# Patient Record
Sex: MALE | Race: WHITE | NOT HISPANIC OR LATINO
[De-identification: names, ages, dates, MRNs, and addresses within clinical notes are randomized per-mention and may not be internally consistent; named-entity substitution may affect disease eponyms.]

---

## 2022-05-10 ENCOUNTER — APPOINTMENT (OUTPATIENT)
Dept: ORTHOPEDIC SURGERY | Facility: AMBULATORY SURGERY CENTER | Age: 42
End: 2022-05-10
Payer: COMMERCIAL

## 2022-05-10 VITALS — BODY MASS INDEX: 28.44 KG/M2 | WEIGHT: 210 LBS | HEIGHT: 72 IN

## 2022-05-10 DIAGNOSIS — S89.90XA UNSPECIFIED INJURY OF UNSPECIFIED LOWER LEG, INITIAL ENCOUNTER: ICD-10-CM

## 2022-05-10 DIAGNOSIS — Z80.7 FAMILY HISTORY OF OTHER MALIGNANT NEOPLASMS OF LYMPHOID, HEMATOPOIETIC AND RELATED TISSUES: ICD-10-CM

## 2022-05-10 DIAGNOSIS — Z78.9 OTHER SPECIFIED HEALTH STATUS: ICD-10-CM

## 2022-05-10 DIAGNOSIS — S53.442A ULNAR COLLATERAL LIGAMENT SPRAIN OF LEFT ELBOW, INITIAL ENCOUNTER: ICD-10-CM

## 2022-05-10 PROCEDURE — 99203 OFFICE O/P NEW LOW 30 MIN: CPT

## 2022-05-10 PROCEDURE — 73564 X-RAY EXAM KNEE 4 OR MORE: CPT | Mod: LT

## 2022-05-10 NOTE — HISTORY OF PRESENT ILLNESS
[de-identified] : Mr. Dillon is a 42 year old male who comes in for evaluation for his LEFT knee. Pain started about 2.5 weeks ago after a day of a lot of walking and a night of dancing. He doesn’t recall doing anything specific that night but had pain and swelling the nest day. He has  a history of PCL tear from 20+ years ago as well as an MCL sprain from 2 years ago. Both injuries had healed and never had any lingering issues until  this recent episode. He has been icing, elevating and taking Advil 4x/day and that has helped. He has pain with walking but stairs are ok. He has had swelling and stiffness. He is wearing a knee brace. \par Pain is 6-7/10\par

## 2022-05-10 NOTE — PHYSICAL EXAM
[LE] : Sensory: Intact in bilateral lower extremities [Normal RLE] : Right Lower Extremity: No scars, rashes, lesions, ulcers, skin intact [Normal LLE] : Left Lower Extremity: No scars, rashes, lesions, ulcers, skin intact [Normal Touch] : sensation intact for touch [Normal] : Oriented to person, place, and time, insight and judgement were intact and the affect was normal [Slightly Antalgic] : slightly antalgic [de-identified] : LEFT knee with right for comparison\par Mildly antalgic gait walking on a flexed left knee.\par 1+ left knee effusion.  No erythema or ecchymoses.\par Tender medial patella facet and mildly medial joint line.\par 2-3+ posterior drawer.\par Ia Lachman.\par 1+ varus laxity.\par Knee range of motion is from 0 to 125 degrees flexion\par Mild varus alignment knee.\par Intact extensor mechanism.\par Normal neurovascular exam distally. [de-identified] : \par \par X-rays taken today of LEFT knee weightbearing 4 views show narrowing medial joint space bone-on-bone in the far medial side on the flexed view with osteophytes consistent with KL 4 changes.  Small osteophytes patellofemoral joint.  Patella nina.

## 2022-05-10 NOTE — ASSESSMENT
[FreeTextEntry1] : 41 y/o with LEFT knee moderately severe medial compartment osteoarthritis and mild patellofemoral arthritis status post PCL tear and MCL tear in the past.  Likely his activity and dancing aggravated the knee arthritis causing the swelling.  I do not think there is any mechanical block to motion.  I recommended first trying course of an anti-inflammatory and doing physical therapy and home exercises which were given.  Ice intermittently for the swelling which has been decreasing.\par If its not better in the next month or so he should come back in for further evaluation and treatment.  With the arthritis we may consider steroid injection sparingly and we spoke about hyaluronic acid injections as an option.  With the arthritis he should try to keep his weight down and keep the muscle strong and do low impact exercise.\par Ultimately in the future knee replacement may be needed but he is young and hopefully can manage this for years.  He really was not having symptoms of the arthritis chronically until the recent event.\par He should follow-up in a month if he is not better or as needed.

## 2022-06-07 ENCOUNTER — APPOINTMENT (OUTPATIENT)
Dept: ORTHOPEDIC SURGERY | Facility: AMBULATORY SURGERY CENTER | Age: 42
End: 2022-06-07
Payer: COMMERCIAL

## 2022-06-07 DIAGNOSIS — Z00.00 ENCOUNTER FOR GENERAL ADULT MEDICAL EXAMINATION W/OUT ABNORMAL FINDINGS: ICD-10-CM

## 2022-06-07 PROCEDURE — 99214 OFFICE O/P EST MOD 30 MIN: CPT | Mod: 25

## 2022-06-07 PROCEDURE — 20610 DRAIN/INJ JOINT/BURSA W/O US: CPT | Mod: 59,LT

## 2022-06-07 NOTE — HISTORY OF PRESENT ILLNESS
[de-identified] : Mr. Dillon is a 42 year old male who comes in for follow-up for his left knee pain and swelling with history of prior PCL tear about 20 years ago and MCL tear about 5 years ago.  He has ongoing pain and swelling in his knee.  I spoke to him on the phone last week.  He had a lot of night pain.  I had him try gabapentin which does not help.  He had finished the diclofenac and was taking over-the-counter NSAIDs.\par He is going to be traveling a lot in the near future.  He has not had a chance to start formal physical therapy but has been doing all of the home exercises and leg lifts on his own regularly.

## 2022-06-07 NOTE — PROCEDURE
[Aspiration] : Aspiration [Injection] : Injection [Left] : of the left [Knee Joint] : knee joint [Effusion] : Effusion [Osteoarthritis] : Osteoarthritis [Patient] : patient [Risk] : risk [Benefits] : benefits [Alternatives] : alternatives [Bleeding] : bleeding [Infection] : infection [Allergic Reaction] : allergic reaction [Verbal Consent Obtained] : verbal consent was obtained prior to the procedure [Alcohol] : Alcohol [Ethyl Chloride Spray] : ethyl chloride spray was used as a topical anesthetic [Lateral] : lateral [20] : a 20-gauge [___ mL Fluid] : [unfilled] mL of [Yellow] : yellow [Clear] : clear [Same Needle/New Syringe] : the syringe was changed and the same needle was left in place and [1% Lidocaine___(mL)] : [unfilled] mL of 1% Lidocaine [Triamcinolone 40mg/mL___(mL)] : [unfilled] ~UmL of 40mg/mL triamcinolone [Bandage Applied] : a bandage [Tolerated Well] : The patient tolerated the procedure well [None] : none [No Strenuous Activity___day(s)] : avoid strenuous activity for [unfilled] day(s) [PRN] : as needed [FreeTextEntry1] : ChloraPrep

## 2022-06-07 NOTE — ASSESSMENT
[FreeTextEntry1] : 43 y/o with LEFT knee moderately severe medial compartment OA and mild patellofemoral arthritis status post PCL tear and MCL tears in the past.\par He is continuing to have pain and swelling.  Today an aspiration and steroid injection were done.  He should ice and can take an NSAID either over-the-counter or there is a refill on the diclofenac prescription.\par He can take 2 Tylenol at night or see if the gabapentin helps between 103 100 mg to help with sleep if necessary.  Hopefully will get relief from the steroids.  Depending on how well this works and for how long we may consider hyaluronic acid injections next.  He should continue with his home exercise program with strengthening and try to get in some formal physical therapy although he is very busy traveling with work.\par Ultimately I think he will need a knee replacement but he is very young so it would be best to try to put that off with not more nonoperative treatment.\par If his knee is not getting better I would like for him to get an MRI.\par He will follow-up in about 6 to 8 weeks or as needed.

## 2022-06-07 NOTE — PHYSICAL EXAM
[Slightly Antalgic] : slightly antalgic [LE] : Sensory: Intact in bilateral lower extremities [Normal RLE] : Right Lower Extremity: No scars, rashes, lesions, ulcers, skin intact [Normal LLE] : Left Lower Extremity: No scars, rashes, lesions, ulcers, skin intact [Normal Touch] : sensation intact for touch [Normal] : Oriented to person, place, and time, insight and judgement were intact and the affect was normal [de-identified] : LEFT knee with right for comparison\par Less antalgic gait\par 1+ left knee effusion.  No erythema or ecchymoses.\par Tender medial patella facet and medial joint line.\par 2-3+ posterior drawer.\par Ia Lachman.\par 1+ valgus laxity.\par Knee range of motion is from 0 to 125 degrees flexion\par Mild varus alignment knee.\par Intact extensor mechanism.\par Normal neurovascular exam distally. [de-identified] : \par \par X-rays taken 5/10/22 of LEFT knee weightbearing 4 views showed narrowing medial joint space bone on bone in the far medial side on the flexed view with osteophytes consistent with KL 4 changes. Small osteophytes patellofemoral joint. Patella nina.

## 2022-07-02 ENCOUNTER — TRANSCRIPTION ENCOUNTER (OUTPATIENT)
Age: 42
End: 2022-07-02

## 2022-07-03 ENCOUNTER — EMERGENCY (EMERGENCY)
Facility: HOSPITAL | Age: 42
LOS: 1 days | Discharge: HOME | End: 2022-07-03
Admitting: EMERGENCY MEDICINE

## 2022-07-03 VITALS
DIASTOLIC BLOOD PRESSURE: 100 MMHG | TEMPERATURE: 99 F | HEART RATE: 93 BPM | WEIGHT: 199.96 LBS | OXYGEN SATURATION: 98 % | RESPIRATION RATE: 18 BRPM | SYSTOLIC BLOOD PRESSURE: 155 MMHG

## 2022-07-03 DIAGNOSIS — S01.511A LACERATION WITHOUT FOREIGN BODY OF LIP, INITIAL ENCOUNTER: ICD-10-CM

## 2022-07-03 DIAGNOSIS — S01.512A LACERATION WITHOUT FOREIGN BODY OF ORAL CAVITY, INITIAL ENCOUNTER: ICD-10-CM

## 2022-07-03 DIAGNOSIS — F17.200 NICOTINE DEPENDENCE, UNSPECIFIED, UNCOMPLICATED: ICD-10-CM

## 2022-07-03 DIAGNOSIS — Z87.81 PERSONAL HISTORY OF (HEALED) TRAUMATIC FRACTURE: ICD-10-CM
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
## 2022-07-03 DIAGNOSIS — M79.641 PAIN IN RIGHT HAND: ICD-10-CM

## 2022-07-03 DIAGNOSIS — R51.9 HEADACHE, UNSPECIFIED: ICD-10-CM
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
## 2022-07-03 PROCEDURE — 73130 X-RAY EXAM OF HAND: CPT | Mod: 26,RT

## 2022-07-03 PROCEDURE — 99285 EMERGENCY DEPT VISIT HI MDM: CPT | Mod: 25

## 2022-07-03 PROCEDURE — 73110 X-RAY EXAM OF WRIST: CPT | Mod: 26,RT
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
## 2022-07-04 VITALS
HEART RATE: 91 BPM | DIASTOLIC BLOOD PRESSURE: 82 MMHG | OXYGEN SATURATION: 98 % | RESPIRATION RATE: 18 BRPM | SYSTOLIC BLOOD PRESSURE: 147 MMHG | TEMPERATURE: 98 F

## 2022-07-04 PROCEDURE — 70450 CT HEAD/BRAIN W/O DYE: CPT | Mod: 26

## 2022-07-04 PROCEDURE — 70486 CT MAXILLOFACIAL W/O DYE: CPT | Mod: 26

## 2022-07-04 PROCEDURE — 73130 X-RAY EXAM OF HAND: CPT | Mod: 26,RT

## 2022-07-04 PROCEDURE — 73110 X-RAY EXAM OF WRIST: CPT | Mod: 26,RT

## 2022-07-04 RX ORDER — ACETAMINOPHEN 500 MG
650 TABLET ORAL ONCE
Refills: 0 | Status: COMPLETED | OUTPATIENT
Start: 2022-07-04 | End: 2022-07-04

## 2022-07-04 RX ADMIN — Medication 650 MILLIGRAM(S): at 00:23

## 2022-07-04 RX ADMIN — Medication 500 MILLIGRAM(S): at 00:23

## 2022-07-04 NOTE — ED PROVIDER NOTE - PHYSICAL EXAMINATION
Gen - WDWN M, +AOB, no acute distress  Skin - warm, dry, 1cm stellate laceration to the mid philtrum, upper lip with active bleeding, no bony exposure, no FB noted   HEENT - AT/NC, PERRL, mild conjunctival injection, pupils 3mm b/l, TM intact with no hemotympanium b/l, +facial tenderness over the jaw region, no malocclusion or loose teeth, o/p clear, uvula midline, airway patent, neck supple with no step off or midline tenderness, FROM   CV - S1S2, R/R/R  Resp - respiration non-labored, CTAB, symmetric bs b/l, no r/r/w  GI - NABS, soft, ND, NT, no rebound or guarding, no CVAT b/l  MS - R hand with prior surgical scar over the dorsum of the 4th MCP region with edema and TTP, restricted ROM 2/2 pain, NV intact, +SILT, symmetric distal pulses b/l, calves supple and NT  Neuro - Alert and awake, slightly slurred speech, ambulatory steady gait, no focal deficits
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
## 2022-07-04 NOTE — ED PROVIDER NOTE - CARE PROVIDER_API CALL
Ministerio Mauro)  Plastic Surgery  [address], [address]  [place] [address]  Phone: [phone]  Fax: [phone]  Follow Up Time:

## 2022-07-04 NOTE — ED PROVIDER NOTE - WR INTERPRETATION 2
MSK XR negative - ?non displaced fx of the 4th PIP or behind the plate, no dislocation, instruments in place, No foreign body

## 2022-07-04 NOTE — ED PROVIDER NOTE - NSFOLLOWUPINSTRUCTIONS_ED_ALL_ED_FT
NON-DISSOLVABLE SUTURES  * Please note minor swelling, redness, pain, itching, and occasional bleeding (that’s controlled by direct pressure) are common with all wounds and normally will go away as wound heals     • Keep dressing clean and dry for 24 hours   • May gently clean wound with soap and water after 24 hours to avoid crusting – PAT DRY after each wash  • Open wound to air or loosen Band-Aid to allow aeration   • Apply Bacitracin or Neosporin ointment twice daily    • Return in 7 days for suture removal    PLEASE SEEK MEDICAL ATTENTION OR RETURN TO ER IMMEDIATELY IF:  * Swelling, redness, or pain increases and cannot be controlled by prescribed pain medication  * Wound feels warm to touch   * Fever >100.4F  * Wound edges reopen/separate   * Foul smelling discharge from wound   * Uncontrolled bleeding with direct pressure  * Increased numbness/tingling/discoloration of wound site     You have a fracture of the hand    A splint has been placed to temporarily immobilize and protect the injured area.      A splint is a temporary cast that   • allows for room for expected swelling   • reduces pain by protecting and supporting the injured area  • is NOT waterproofed  • should NOT be removed unless instructed to do so     FRACTURE CARE  • Swelling of the injured area is common during the first few days.  Elevate your splint above the level of your heart frequently to reduce swelling   • Apply ice covered with a thin towel to the splint for 20 minutes every 2 hours while awake to reduce swelling and pain   • Keep your splint clean and dry at all times.  If it becomes wet, dry it with a hair dryer ONLY on the COOL setting   • Do not apply powder or lotion on or near the splint   • Do not pull the padding out from inside your splint   • Do not place anything inside the splint, even for itchy areas.  Sticking items inside can injure the skin and lead to infection   • Do not put weight on injured site unless cleared by your provider     PLEASE SEEK MEDICAL ATTENTION OR RETURN TO ER IMMEDIATELY IF:  * You have severe pain or pain that is getting worse without relief from medications   * You have sores or cuts on the skin under the splint   * You are unable to move your fingers or toes   * Your fingers or toes are blue or cold   * Your splint becomes soaking wet or you are unable to dry it   * Your splint has foul odor, feels too tight, cracks, or becomes grossly soiled  * Fever >100.4F     *** PLEASE FOLLOW UP WITH ORTHOPEDIST IN 7 DAYS ***

## 2022-07-04 NOTE — ED PROVIDER NOTE - PATIENT PORTAL LINK FT
You can access the FollowMyHealth Patient Portal offered by [facility] by registering at the following website: [email or web address]. By joining [facility]’s FollowMyHealth portal, you will also be able to view your health information using other applications (apps) compatible with our system.

## 2023-02-02 ENCOUNTER — EMERGENCY (EMERGENCY)
Facility: HOSPITAL | Age: 43
LOS: 1 days | Discharge: LEFT BEFORE DISCHARGE | End: 2023-02-02
Attending: EMERGENCY MEDICINE | Admitting: EMERGENCY MEDICINE
Payer: COMMERCIAL

## 2023-02-02 VITALS
RESPIRATION RATE: 20 BRPM | HEART RATE: 108 BPM | TEMPERATURE: 98 F | DIASTOLIC BLOOD PRESSURE: 97 MMHG | OXYGEN SATURATION: 100 % | WEIGHT: 205.03 LBS | SYSTOLIC BLOOD PRESSURE: 157 MMHG

## 2023-02-02 PROCEDURE — L9991: CPT

## 2023-02-03 PROBLEM — Z87.81 PERSONAL HISTORY OF (HEALED) TRAUMATIC FRACTURE: Chronic | Status: ACTIVE | Noted: 2022-07-04

## 2023-02-06 DIAGNOSIS — R09.81 NASAL CONGESTION: ICD-10-CM

## 2023-02-06 DIAGNOSIS — Z53.21 PROCEDURE AND TREATMENT NOT CARRIED OUT DUE TO PATIENT LEAVING PRIOR TO BEING SEEN BY HEALTH CARE PROVIDER: ICD-10-CM

## 2023-06-01 ENCOUNTER — APPOINTMENT (OUTPATIENT)
Dept: ORTHOPEDIC SURGERY | Facility: CLINIC | Age: 43
End: 2023-06-01
Payer: COMMERCIAL

## 2023-06-01 DIAGNOSIS — M25.562 PAIN IN LEFT KNEE: ICD-10-CM

## 2023-06-01 PROCEDURE — 99214 OFFICE O/P EST MOD 30 MIN: CPT | Mod: 25

## 2023-06-01 PROCEDURE — 20610 DRAIN/INJ JOINT/BURSA W/O US: CPT | Mod: 59,LT

## 2023-06-01 RX ORDER — GABAPENTIN 100 MG/1
100 CAPSULE ORAL
Qty: 30 | Refills: 1 | Status: COMPLETED | COMMUNITY
Start: 2022-06-03 | End: 2023-06-01

## 2023-06-01 RX ORDER — HYALURONATE SODIUM, STABILIZED 88 MG/4 ML
88 SYRINGE (ML) INTRAARTICULAR
Qty: 1 | Refills: 0 | Status: ACTIVE | OUTPATIENT
Start: 2023-06-01

## 2023-06-01 RX ORDER — DICLOFENAC SODIUM 50 MG/1
50 TABLET, DELAYED RELEASE ORAL TWICE DAILY
Qty: 30 | Refills: 1 | Status: COMPLETED | COMMUNITY
Start: 2022-05-10 | End: 2023-06-01

## 2023-06-01 RX ORDER — IBUPROFEN 800 MG/1
800 TABLET, FILM COATED ORAL
Refills: 0 | Status: ACTIVE | COMMUNITY

## 2023-06-01 NOTE — ASSESSMENT
[FreeTextEntry1] : 42 y/o with LEFT knee moderately severe medial compartment OA and mild patellofemoral arthritis status post PCL tear and MCL tears in the past.\par His knee was very swollen today and he is having constant pain despite taking daily NSAIDs.  We did a steroid injection a year ago and today I did an aspiration and steroid injection to try to quiet down the inflammation.  He is never done hyaluronic acid injection and I think he may benefit from that.  He is young and we would like to try to put off knee replacement surgery.\par He will again try physical therapy which had also been prescribed a year ago.  He should continue with strengthening and ways that does not aggravate the knee arthritis.  Keeping the weight down can be helpful.  He can continue with the ibuprofen.  We have tried diclofenac which did not help any better.  He will follow-up in about 4 weeks by which time hopefully we have the hyaluronic acid injection which we will try.\par If the swelling recurs then may send the fluid for specimen but it did appear to be just normal synovial fluid but we can work-up further if necessary.\par

## 2023-06-01 NOTE — HISTORY OF PRESENT ILLNESS
[de-identified] : Mr. Dillon is a 43 year old male who comes in for follow-up for his left knee pain and swelling with history of prior PCL tear about 20 years ago and MCL tear about 5 years ago.  He has ongoing pain and swelling in his knee. Pt return today for cortisone injections and to discuss nonsurgical and surgery options.\par He states that the steroid injection helped for several months last year.  His knee is consistently more painful.  He takes 800 mg ibuprofen every single day.  His knee has been consistently swollen and painful.  He was hoping to get another steroid injection but we also had talked about hyaluronic acid injections.  He has done physical therapy and has been on a exercise program over the past year to strengthen the knee.  Pain is about 5-7 out of 10.  Is worse walking.\par I had him take diclofenac for several weeks last year which did not help any more than the ibuprofen and ibuprofen may be better for him he states.

## 2023-06-01 NOTE — PHYSICAL EXAM
[Slightly Antalgic] : slightly antalgic [LE] : Sensory: Intact in bilateral lower extremities [Normal RLE] : Right Lower Extremity: No scars, rashes, lesions, ulcers, skin intact [Normal LLE] : Left Lower Extremity: No scars, rashes, lesions, ulcers, skin intact [Normal Touch] : sensation intact for touch [Normal] : Oriented to person, place, and time, insight and judgement were intact and the affect was normal [de-identified] : LEFT knee with right for comparison\par Mildly antalgic gait\par 2-3+ left knee effusion.  No erythema or ecchymoses.\par Tender medial patella facet and medial joint line.\par 2+ posterior drawer.\par Ia Lachman.\par 1+ valgus laxity.\par Knee range of motion is from 0 to 125 degrees flexion\par Mild varus alignment knee.\par Intact extensor mechanism.\par Normal neurovascular exam distally. [de-identified] : Overweight [de-identified] : \par X-rays taken 5/10/2022 of LEFT knee weightbearing 4 views show narrowing medial joint space bone-on-bone in the far medial side on the flexed view with osteophytes consistent with KL 4 changes.  Small osteophytes patellofemoral joint.  Patella nina.

## 2023-06-01 NOTE — PROCEDURE
[Injection] : Injection [Knee Joint] : knee joint [Effusion] : Effusion [Osteoarthritis] : Osteoarthritis [Patient] : patient [Risk] : risk [Benefits] : benefits [Alternatives] : alternatives [Bleeding] : bleeding [Infection] : infection [Allergic Reaction] : allergic reaction [Verbal Consent Obtained] : verbal consent was obtained prior to the procedure [Alcohol] : Alcohol [Ethyl Chloride Spray] : ethyl chloride spray was used as a topical anesthetic [Lateral] : lateral [20] : a 20-gauge [___ mL Fluid] : [unfilled] mL of [Yellow] : yellow [Clear] : clear [Same Needle/New Syringe] : the syringe was changed and the same needle was left in place and [1% Lidocaine___(mL)] : [unfilled] mL of 1% Lidocaine [Triamcinolone 40mg/mL___(mL)] : [unfilled] ~UmL of 40mg/mL triamcinolone [Bandage Applied] : a bandage [Tolerated Well] : The patient tolerated the procedure well [None] : none [No Strenuous Activity___day(s)] : avoid strenuous activity for [unfilled] day(s) [FreeTextEntry1] : ChloraPrep

## 2023-11-03 ENCOUNTER — APPOINTMENT (OUTPATIENT)
Dept: ORTHOPEDIC SURGERY | Facility: CLINIC | Age: 43
End: 2023-11-03

## 2023-11-15 ENCOUNTER — APPOINTMENT (OUTPATIENT)
Dept: ORTHOPEDIC SURGERY | Facility: CLINIC | Age: 43
End: 2023-11-15
Payer: COMMERCIAL

## 2023-11-15 PROCEDURE — 99213 OFFICE O/P EST LOW 20 MIN: CPT | Mod: 25

## 2023-11-15 PROCEDURE — 20610 DRAIN/INJ JOINT/BURSA W/O US: CPT | Mod: 59,LT

## 2023-11-15 PROCEDURE — 73564 X-RAY EXAM KNEE 4 OR MORE: CPT | Mod: RT

## 2024-04-22 ENCOUNTER — APPOINTMENT (OUTPATIENT)
Dept: ORTHOPEDIC SURGERY | Facility: CLINIC | Age: 44
End: 2024-04-22
Payer: COMMERCIAL

## 2024-04-22 DIAGNOSIS — M23.52 CHRONIC INSTABILITY OF KNEE, LEFT KNEE: ICD-10-CM

## 2024-04-22 DIAGNOSIS — S83.412S SPRAIN OF MEDIAL COLLATERAL LIGAMENT OF LEFT KNEE, SEQUELA: ICD-10-CM

## 2024-04-22 PROCEDURE — 20610 DRAIN/INJ JOINT/BURSA W/O US: CPT | Mod: 59,LT

## 2024-04-22 PROCEDURE — 99214 OFFICE O/P EST MOD 30 MIN: CPT | Mod: 25

## 2024-04-22 RX ORDER — CELECOXIB 200 MG/1
200 CAPSULE ORAL DAILY
Qty: 30 | Refills: 1 | Status: ACTIVE | COMMUNITY
Start: 2024-04-22 | End: 1900-01-01

## 2024-04-22 NOTE — PHYSICAL EXAM
[de-identified] : LEFT knee with right for comparison Varus alignment.  Mildly antalgic. 2+ left knee effusion.  No erythema or ecchymoses. Tender medial patella facet and medial joint line. 2+ posterior drawer.  1+ valgus laxity Ia Lachman. Knee range of motion is from 0 to 120 degrees flexion Intact extensor mechanism. Normal neurovascular exam distally. [de-identified] : Overweight [de-identified] :  X-rays 11/15/23 left knee 4 views compared to x-rays on 5/10/2022 of LEFT knee weightbearing 4 views showed increased narrowing medial joint space, severe bone-on-bone medial compartment greatest on the flexed view with osteophytes consistent with KL 4 changes.  Small osteophytes patellofemoral joint.  Patella nina.  There is some osteophytes lateral compartment as well consistent with degeneration.

## 2024-04-22 NOTE — PROCEDURE
[Aspiration] : Aspiration [Left] : of the left [Knee Joint] : knee joint [Effusion] : Effusion [Osteoarthritis] : Osteoarthritis [Patient] : patient [Risk] : risk [Benefits] : benefits [Alternatives] : alternatives [Bleeding] : bleeding [Infection] : infection [Allergic Reaction] : allergic reaction [Verbal Consent Obtained] : verbal consent was obtained prior to the procedure [Alcohol] : Alcohol [Ethyl Chloride Spray] : ethyl chloride spray was used as a topical anesthetic [20] : a 20-gauge [___ mL Fluid] : [unfilled] mL of [Yellow] : yellow [Clear] : clear [Same Needle/New Syringe] : the syringe was changed and the same needle was left in place and [1% Lidocaine___(mL)] : [unfilled] mL of 1% Lidocaine [Triamcinolone 40mg/mL___(mL)] : [unfilled] ~UmL of 40mg/mL triamcinolone [Bandage Applied] : a bandage [Tolerated Well] : The patient tolerated the procedure well [None] : none [No Strenuous Activity___day(s)] : avoid strenuous activity for [unfilled] day(s) [___ Week(s)] : in [unfilled] week(s) [FreeTextEntry1] : ChloraPrep

## 2024-04-22 NOTE — ASSESSMENT
[FreeTextEntry1] : 44-year-old with left knee osteoarthritis severe in the medial compartment and more mild patellofemoral compartment status post MCL and PCL tears in the past.  There is recurrent significant swelling and worsening pain again. Aspiration and steroid injection was performed today.  This provided some immediate pain relief. NSAIDs prn.  I prescribed Celebrex which she will take consistently for a week or so to try to keep down inflammation and then as needed Ice PT  F/U for HA injection in the next 3 to 4 weeks.  Hopefully this will give him some longer-term relief until he is ready for knee replacement. I have given him the name of the knee replacement specialist to start the process of seeing someone and planning.  He has arthritis mainly medial compartment but also some patellofemoral.  I would question if he is a candidate for partial knee replacement given the history of ligament tears and some patellofemoral arthritis.  Given that he is young the preservation of bone would be nice.  Obviously good function is most important.  Because he is young he likely would need a revision in the future either way.

## 2024-04-22 NOTE — HISTORY OF PRESENT ILLNESS
[de-identified] : Mr. Dillon is a 44 year old who comes in for follow-up for his left knee pain and swelling with history of prior PCL tear about 20 years ago and MCL tear about 5 years ago, now with OA. Steroid injection was performed in the left knee last visit November 15, 2023.  He has not had hyaluronic acid injections.  Steroid injection was helpful. The knee is more painful and tight and swollen again.  A few weeks ago it was even more swollen.  He has been taking some ibuprofen 4 tablets/day.  He moves that does more walking and also has stairs to climb.  No injuries.  He is starting to think that he would do a knee replacement at some point next year.  He is limping more when walking.  More constant medial pain

## 2024-05-07 ENCOUNTER — APPOINTMENT (OUTPATIENT)
Dept: ORTHOPEDIC SURGERY | Facility: CLINIC | Age: 44
End: 2024-05-07
Payer: COMMERCIAL

## 2024-05-07 DIAGNOSIS — M25.462 EFFUSION, LEFT KNEE: ICD-10-CM

## 2024-05-07 DIAGNOSIS — M17.12 UNILATERAL PRIMARY OSTEOARTHRITIS, LEFT KNEE: ICD-10-CM

## 2024-05-07 PROCEDURE — 20610 DRAIN/INJ JOINT/BURSA W/O US: CPT | Mod: LT

## 2024-05-07 NOTE — PROCEDURE
[Injection] : Injection [Left] : of the left [Knee Joint] : knee joint [Osteoarthritis] : Osteoarthritis [Inflammation] : Inflammation [Patient] : patient [Risk] : risk [Benefits] : benefits [Alternatives] : alternatives [Bleeding] : bleeding [Infection] : infection [Allergic Reaction] : allergic reaction [Verbal Consent Obtained] : verbal consent was obtained prior to the procedure [Alcohol] : Alcohol [Ethyl Chloride Spray] : ethyl chloride spray was used as a topical anesthetic [Lateral] : lateral [Bandage Applied] : a bandage [Tolerated Well] : The patient tolerated the procedure well [None] : none [No Strenuous Activity___day(s)] : avoid strenuous activity for [unfilled] day(s) [PRN] : as needed [Aspiration] : Aspiration [20] : a 20-gauge [___ mL Fluid] : [unfilled] mL of [Yellow] : yellow [Clear] : clear [Same Needle/New Syringe] : the syringe was changed and the same needle was left in place and [de-identified] : LEFT knee Monovisc Lot #[id number]/20/[id number] [FreeTextEntry1] : chloraprep [FreeTextEntry8] : Monovisc 4ml

## 2024-08-14 ENCOUNTER — APPOINTMENT (OUTPATIENT)
Dept: ORTHOPEDIC SURGERY | Facility: CLINIC | Age: 44
End: 2024-08-14
Payer: COMMERCIAL

## 2024-08-14 DIAGNOSIS — M25.562 PAIN IN LEFT KNEE: ICD-10-CM

## 2024-08-14 DIAGNOSIS — M23.52 CHRONIC INSTABILITY OF KNEE, LEFT KNEE: ICD-10-CM

## 2024-08-14 DIAGNOSIS — M25.462 EFFUSION, LEFT KNEE: ICD-10-CM

## 2024-08-14 DIAGNOSIS — M17.12 UNILATERAL PRIMARY OSTEOARTHRITIS, LEFT KNEE: ICD-10-CM

## 2024-08-14 PROCEDURE — 99213 OFFICE O/P EST LOW 20 MIN: CPT | Mod: 25

## 2024-08-14 PROCEDURE — 20610 DRAIN/INJ JOINT/BURSA W/O US: CPT | Mod: 59,LT

## 2024-08-14 NOTE — PROCEDURE
[Aspiration] : Aspiration [Injection] : Injection [Left] : of the left [Knee Joint] : knee joint [Inflammation] : Inflammation [Patient] : patient [Risk] : risk [Benefits] : benefits [Alternatives] : alternatives [Bleeding] : bleeding [Infection] : infection [Allergic Reaction] : allergic reaction [Verbal Consent Obtained] : verbal consent was obtained prior to the procedure [Alcohol] : Alcohol [Ethyl Chloride Spray] : ethyl chloride spray was used as a topical anesthetic [Lateral] : lateral [20] : a 20-gauge [1% Lidocaine___(mL)] : [unfilled] mL of 1% Lidocaine [Triamcinolone 40mg/mL___(mL)] : [unfilled] ~UmL of 40mg/mL triamcinolone [Bandage Applied] : a bandage [Tolerated Well] : The patient tolerated the procedure well [None] : none [No Strenuous Activity___day(s)] : avoid strenuous activity for [unfilled] day(s) [PRN] : as needed [Osteoarthritis] : Osteoarthritis [___ mL Fluid] : [unfilled] mL of [Same Needle/New Syringe] : the syringe was changed and the same needle was left in place and [FreeTextEntry1] : chloraprep

## 2024-08-14 NOTE — ASSESSMENT
[FreeTextEntry1] : 44-year-old with left knee osteoarthritis severe in the medial compartment and more mild patellofemoral compartment status post MCL and PCL tears in the past.  There is recurrent significant swelling. Aspiration and steroid injection was done again today. Hopefully this will give him some relief.  He can use heat and ice.  He was given home exercises to do more strengthening for the knee and continue with biking.  Keeping weight down is important. There is severe medial compartment osteoarthritis.  Given his ongoing chronic symptoms I referred him to a couple doctors regarding possible knee replacement which she may consider in the next couple years or possibly sooner.  I talked to him a bit about knee replacement today and recovery. He does get a lot of persistent effusions.  The fluid appears clear and normal. If this injection does not help or if it is staying persistent we may workup further but I think likely it is from the arthritis.  He does not have other joint symptoms. New x-rays next visit. In November we can do the hyaluronic acid injections again if he feels it has been helpful and if he is going to be continuing to put off knee replacement surgery.  He can call a few weeks before if we need to order the injections

## 2024-08-14 NOTE — PHYSICAL EXAM
[Slightly Antalgic] : slightly antalgic [LE] : Sensory: Intact in bilateral lower extremities [Normal RLE] : Right Lower Extremity: No scars, rashes, lesions, ulcers, skin intact [Normal LLE] : Left Lower Extremity: No scars, rashes, lesions, ulcers, skin intact [Normal Touch] : sensation intact for touch [Normal] : Oriented to person, place, and time, insight and judgement were intact and the affect was normal [de-identified] : LEFT knee with right for comparison Varus alignment.  Mildly antalgic. 2-3+ left knee effusion.  No erythema or ecchymoses. Tender medial patella facet and medial joint line. 2+ posterior drawer.  1+ valgus laxity Ia Lachman. Knee range of motion is from 0 to 120 degrees flexion Intact extensor mechanism. Normal neurovascular exam distally. [de-identified] : Overweight [de-identified] :  X-rays 11/15/23 left knee 4 views compared to x-rays on 5/10/2022 of LEFT knee weightbearing 4 views showed increased narrowing medial joint space, severe bone-on-bone medial compartment greatest on the flexed view with osteophytes consistent with KL 4 changes.  Small osteophytes patellofemoral joint.  Patella nina.  There is some osteophytes lateral compartment as well consistent with degeneration.

## 2024-08-14 NOTE — PHYSICAL EXAM
[Slightly Antalgic] : slightly antalgic [LE] : Sensory: Intact in bilateral lower extremities [Normal RLE] : Right Lower Extremity: No scars, rashes, lesions, ulcers, skin intact [Normal LLE] : Left Lower Extremity: No scars, rashes, lesions, ulcers, skin intact [Normal Touch] : sensation intact for touch [Normal] : Oriented to person, place, and time, insight and judgement were intact and the affect was normal [de-identified] : LEFT knee with right for comparison Varus alignment.  Mildly antalgic. 2-3+ left knee effusion.  No erythema or ecchymoses. Tender medial patella facet and medial joint line. 2+ posterior drawer.  1+ valgus laxity Ia Lachman. Knee range of motion is from 0 to 120 degrees flexion Intact extensor mechanism. Normal neurovascular exam distally. [de-identified] : Overweight [de-identified] :  X-rays 11/15/23 left knee 4 views compared to x-rays on 5/10/2022 of LEFT knee weightbearing 4 views showed increased narrowing medial joint space, severe bone-on-bone medial compartment greatest on the flexed view with osteophytes consistent with KL 4 changes.  Small osteophytes patellofemoral joint.  Patella nina.  There is some osteophytes lateral compartment as well consistent with degeneration.

## 2024-08-14 NOTE — HISTORY OF PRESENT ILLNESS
[de-identified] : Mr. Dillon is a 44 year old who comes in for follow-up for his left knee pain and swelling with arthritis and history of prior PCL tear about 20 years ago and MCL tear Monovisc and aspiration was done in May and he had a steroid injection in April. All of these injections helped temporarily.  His knee was feeling much better but the knee is swelling more again.  He does go to the gym regularly and rides a stationary bike but has not been doing any real strengthening for his legs because he does not want aggravate the knee.  It is a constant problem for him.  He takes usually 4 ibuprofen in the morning and then might take 2 more in the day.  Stairs aggravate the knee.  It was swelling a lot recently and he was hoping to get an aspiration and steroid injection to get some relief at least temporarily

## 2024-08-14 NOTE — HISTORY OF PRESENT ILLNESS
[de-identified] : Mr. Dillon is a 44 year old who comes in for follow-up for his left knee pain and swelling with arthritis and history of prior PCL tear about 20 years ago and MCL tear Monovisc and aspiration was done in May and he had a steroid injection in April. All of these injections helped temporarily.  His knee was feeling much better but the knee is swelling more again.  He does go to the gym regularly and rides a stationary bike but has not been doing any real strengthening for his legs because he does not want aggravate the knee.  It is a constant problem for him.  He takes usually 4 ibuprofen in the morning and then might take 2 more in the day.  Stairs aggravate the knee.  It was swelling a lot recently and he was hoping to get an aspiration and steroid injection to get some relief at least temporarily

## 2024-12-10 ENCOUNTER — APPOINTMENT (OUTPATIENT)
Dept: ORTHOPEDIC SURGERY | Facility: CLINIC | Age: 44
End: 2024-12-10
Payer: COMMERCIAL

## 2024-12-10 DIAGNOSIS — Z00.00 ENCOUNTER FOR GENERAL ADULT MEDICAL EXAMINATION W/OUT ABNORMAL FINDINGS: ICD-10-CM

## 2024-12-10 DIAGNOSIS — M17.12 UNILATERAL PRIMARY OSTEOARTHRITIS, LEFT KNEE: ICD-10-CM

## 2024-12-10 DIAGNOSIS — M23.52 CHRONIC INSTABILITY OF KNEE, LEFT KNEE: ICD-10-CM

## 2024-12-10 DIAGNOSIS — M25.462 EFFUSION, LEFT KNEE: ICD-10-CM

## 2024-12-10 DIAGNOSIS — M25.562 PAIN IN LEFT KNEE: ICD-10-CM

## 2024-12-10 LAB
SYCRY CLARITY: NORMAL
SYCRY COLOR: YELLOW
SYCRY ID: NORMAL
SYCRY TUBE: NORMAL

## 2024-12-10 PROCEDURE — 99213 OFFICE O/P EST LOW 20 MIN: CPT | Mod: 25

## 2024-12-10 PROCEDURE — 20610 DRAIN/INJ JOINT/BURSA W/O US: CPT | Mod: 59,LT

## 2025-02-03 PROBLEM — Z00.00 ENCOUNTER FOR PREVENTIVE HEALTH EXAMINATION: Status: ACTIVE | Noted: 2025-02-03

## 2025-02-06 ENCOUNTER — APPOINTMENT (OUTPATIENT)
Dept: SURGERY | Facility: CLINIC | Age: 45
End: 2025-02-06

## 2025-02-13 ENCOUNTER — APPOINTMENT (OUTPATIENT)
Dept: SURGERY | Facility: CLINIC | Age: 45
End: 2025-02-13

## 2025-02-20 ENCOUNTER — APPOINTMENT (OUTPATIENT)
Dept: SURGERY | Facility: CLINIC | Age: 45
End: 2025-02-20

## 2025-02-20 VITALS
DIASTOLIC BLOOD PRESSURE: 87 MMHG | WEIGHT: 215 LBS | TEMPERATURE: 97.9 F | OXYGEN SATURATION: 98 % | HEIGHT: 70.5 IN | BODY MASS INDEX: 30.44 KG/M2 | SYSTOLIC BLOOD PRESSURE: 135 MMHG | HEART RATE: 59 BPM

## 2025-02-20 DIAGNOSIS — K42.9 UMBILICAL HERNIA W/OUT OBSTRUCTION OR GANGRENE: ICD-10-CM

## 2025-02-20 PROCEDURE — 99204 OFFICE O/P NEW MOD 45 MIN: CPT

## 2025-04-09 ENCOUNTER — NON-APPOINTMENT (OUTPATIENT)
Age: 45
End: 2025-04-09